# Patient Record
Sex: MALE | Race: OTHER
[De-identification: names, ages, dates, MRNs, and addresses within clinical notes are randomized per-mention and may not be internally consistent; named-entity substitution may affect disease eponyms.]

---

## 2020-07-27 ENCOUNTER — HOSPITAL ENCOUNTER (EMERGENCY)
Dept: HOSPITAL 54 - ER | Age: 25
Discharge: HOME | End: 2020-07-27
Payer: COMMERCIAL

## 2020-07-27 VITALS — WEIGHT: 150 LBS | HEIGHT: 70 IN | BODY MASS INDEX: 21.47 KG/M2

## 2020-07-27 VITALS — DIASTOLIC BLOOD PRESSURE: 77 MMHG | SYSTOLIC BLOOD PRESSURE: 128 MMHG
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
## 2020-07-27 DIAGNOSIS — F17.200: ICD-10-CM
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
## 2020-07-27 DIAGNOSIS — S02.2XXA: Primary | ICD-10-CM
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
## 2020-07-27 PROCEDURE — 70450 CT HEAD/BRAIN W/O DYE: CPT

## 2020-07-27 PROCEDURE — 99285 EMERGENCY DEPT VISIT HI MDM: CPT

## 2020-07-27 PROCEDURE — 72125 CT NECK SPINE W/O DYE: CPT

## 2020-07-27 PROCEDURE — 70486 CT MAXILLOFACIAL W/O DYE: CPT

## 2020-07-27 PROCEDURE — A6403 STERILE GAUZE>16 <= 48 SQ IN: HCPCS

## 2020-07-27 NOTE — NUR
pt ok to discharge per dr ryan. Patient discharged to home in stable condition. 
Written and verbal after care instructions given. Patient verbalizes 
understanding of instruction. Patient is awake and alert to self, day, and 
place. pt ambulatory with a steady gait
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
## 2021-04-19 ENCOUNTER — HOSPITAL ENCOUNTER (EMERGENCY)
Dept: HOSPITAL 54 - ER | Age: 26
Discharge: LEFT BEFORE DISCHARGE | End: 2021-04-19
Payer: COMMERCIAL

## 2021-04-19 DIAGNOSIS — Z53.21: Primary | ICD-10-CM

## 2021-11-18 ENCOUNTER — HOSPITAL ENCOUNTER (EMERGENCY)
Dept: HOSPITAL 54 - ER | Age: 26
Discharge: HOME | End: 2021-11-18
Payer: COMMERCIAL

## 2021-11-18 VITALS — SYSTOLIC BLOOD PRESSURE: 131 MMHG | DIASTOLIC BLOOD PRESSURE: 84 MMHG

## 2021-11-18 VITALS — HEIGHT: 70 IN | WEIGHT: 145 LBS | BODY MASS INDEX: 20.76 KG/M2
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
## 2021-11-18 DIAGNOSIS — F17.200: ICD-10-CM

## 2021-11-18 DIAGNOSIS — S81.811A: ICD-10-CM
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
## 2021-11-18 DIAGNOSIS — S81.812A: Primary | ICD-10-CM

## 2021-11-18 PROCEDURE — 99283 EMERGENCY DEPT VISIT LOW MDM: CPT

## 2021-11-18 PROCEDURE — A6403 STERILE GAUZE>16 <= 48 SQ IN: HCPCS

## 2021-11-18 PROCEDURE — 73590 X-RAY EXAM OF LOWER LEG: CPT

## 2021-11-18 PROCEDURE — 12004 RPR S/N/AX/GEN/TRK7.6-12.5CM: CPT

## 2021-11-18 PROCEDURE — 96372 THER/PROPH/DIAG INJ SC/IM: CPT

## 2021-11-18 NOTE — NUR
Patient discharged to home in stable condition. rx and Written and verbal after 
care instructions given. Patient verbalizes understanding of instruction.

## 2021-12-02 ENCOUNTER — HOSPITAL ENCOUNTER (EMERGENCY)
Dept: HOSPITAL 54 - ER | Age: 26
Discharge: HOME | End: 2021-12-02
Payer: COMMERCIAL

## 2021-12-02 VITALS — HEIGHT: 70 IN | BODY MASS INDEX: 22.9 KG/M2 | WEIGHT: 160 LBS

## 2021-12-02 VITALS — SYSTOLIC BLOOD PRESSURE: 126 MMHG | DIASTOLIC BLOOD PRESSURE: 80 MMHG
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
## 2021-12-02 DIAGNOSIS — F17.200: ICD-10-CM

## 2021-12-02 DIAGNOSIS — S81.812D: Primary | ICD-10-CM
